# Patient Record
Sex: MALE | Race: BLACK OR AFRICAN AMERICAN | NOT HISPANIC OR LATINO | Employment: FULL TIME | URBAN - METROPOLITAN AREA
[De-identification: names, ages, dates, MRNs, and addresses within clinical notes are randomized per-mention and may not be internally consistent; named-entity substitution may affect disease eponyms.]

---

## 2021-06-09 ENCOUNTER — OFFICE VISIT (OUTPATIENT)
Dept: FAMILY MEDICINE CLINIC | Facility: CLINIC | Age: 31
End: 2021-06-09

## 2021-06-09 VITALS
SYSTOLIC BLOOD PRESSURE: 118 MMHG | HEART RATE: 70 BPM | TEMPERATURE: 97.6 F | RESPIRATION RATE: 18 BRPM | BODY MASS INDEX: 30.76 KG/M2 | HEIGHT: 67 IN | DIASTOLIC BLOOD PRESSURE: 78 MMHG | WEIGHT: 196 LBS

## 2021-06-09 DIAGNOSIS — I34.1 MITRAL VALVE PROLAPSE: Primary | ICD-10-CM

## 2021-06-09 DIAGNOSIS — Z80.0 FAMILY HISTORY OF COLON CANCER: ICD-10-CM

## 2021-06-09 PROCEDURE — 99203 OFFICE O/P NEW LOW 30 MIN: CPT | Performed by: PHYSICIAN ASSISTANT

## 2021-06-09 NOTE — PROGRESS NOTES
Subjective   Juan Manuel Davis is a 31 y.o. male.     History of Present Illness   Mitral valve prolapse diagnosed in childhood. Able to play sports like soccer, football and track without issue   Was getting Echo checked every 2 years   Competed in college with track and cross country and had it checked regularly at that time (notes he had to stop running due to issues with contusions and microfractures with his shins)   Only had echo checked once since 2013   Wants to get back into exercising   No recent heart palpitations    Does not drink caffeine   Unsure of cardiologist's name but thinks he has some records at [facility] he will request     Diagnosed with asthma in childhood   Has not needed inhaler since 6th grade     Grandfather had colon cancer in his 70s.   Mother and father have not had any issus with polyps or cancer   Brother has issue where he can not digest husks or casings   Wants colon cancer screening     Bipolar, anxiety, and depression run in family. Pt denies any issues with this     Pt interested in physical with screening labs in the future       The following portions of the patient's history were reviewed and updated as appropriate: allergies, current medications, past family history, past medical history, past social history, past surgical history and problem list.    Review of Systems   Constitutional: Negative.  Negative for chills, diaphoresis, fatigue and fever.   HENT: Negative.  Negative for congestion, ear discharge, ear pain, hearing loss, nosebleeds, postnasal drip, sinus pressure, sneezing and sore throat.    Eyes: Negative.    Respiratory: Negative.  Negative for cough, chest tightness, shortness of breath and wheezing.    Cardiovascular: Negative.  Negative for chest pain, palpitations and leg swelling.   Gastrointestinal: Negative for abdominal distention, abdominal pain, blood in stool, constipation, diarrhea, nausea and vomiting.   Genitourinary: Negative.  Negative for difficulty  "urinating, dysuria, flank pain, frequency, hematuria and urgency.   Musculoskeletal: Negative.  Negative for arthralgias, back pain, gait problem, joint swelling, myalgias, neck pain and neck stiffness.   Skin: Negative.  Negative for color change, pallor, rash and wound.   Neurological: Negative for dizziness, syncope, weakness, light-headedness, numbness and headaches.       Objective    Blood pressure 118/78, pulse 70, temperature 97.6 °F (36.4 °C), resp. rate 18, height 168.9 cm (66.5\"), weight 88.9 kg (196 lb).     Physical Exam  Vitals and nursing note reviewed.   Constitutional:       Appearance: Normal appearance. He is well-developed.   HENT:      Head: Normocephalic and atraumatic.      Right Ear: Tympanic membrane, ear canal and external ear normal.      Left Ear: Tympanic membrane, ear canal and external ear normal.      Nose: Nose normal.   Eyes:      Conjunctiva/sclera: Conjunctivae normal.   Neck:      Thyroid: No thyromegaly.      Trachea: No tracheal deviation.   Cardiovascular:      Rate and Rhythm: Normal rate and regular rhythm.      Comments: Mid systolic click   Pulmonary:      Effort: Pulmonary effort is normal. No respiratory distress.      Breath sounds: Normal breath sounds. No wheezing or rales.   Chest:      Chest wall: No tenderness.   Abdominal:      General: Abdomen is flat. Bowel sounds are normal. There is no distension.      Palpations: There is no mass.      Tenderness: There is no abdominal tenderness. There is no guarding or rebound.      Hernia: No hernia is present.   Musculoskeletal:      Cervical back: Normal range of motion and neck supple.   Lymphadenopathy:      Cervical: No cervical adenopathy.   Skin:     General: Skin is warm and dry.   Neurological:      Mental Status: He is alert and oriented to person, place, and time.   Psychiatric:         Mood and Affect: Mood normal.         Behavior: Behavior normal.         Thought Content: Thought content normal.         " Judgment: Judgment normal.         Assessment/Plan   Diagnoses and all orders for this visit:    1. Mitral valve prolapse (Primary)  -     Adult Transthoracic Echo Complete W/ Cont if Necessary Per Protocol  -     Ambulatory Referral to Cardiology    2. Family history of colon cancer  -     Ambulatory Referral to Gastroenterology      Referral for ECHO and cardiology placed.   Pt will likely not qualify for early colon cancer screening due to grandfather being advanced in age at diagnosis, however will get him set up with GI for consult to discuss per his request.

## 2021-07-21 ENCOUNTER — APPOINTMENT (OUTPATIENT)
Dept: CARDIOLOGY | Facility: HOSPITAL | Age: 31
End: 2021-07-21

## 2021-07-26 ENCOUNTER — OFFICE VISIT (OUTPATIENT)
Dept: FAMILY MEDICINE CLINIC | Facility: CLINIC | Age: 31
End: 2021-07-26

## 2021-07-26 VITALS
DIASTOLIC BLOOD PRESSURE: 74 MMHG | BODY MASS INDEX: 30.92 KG/M2 | HEIGHT: 67 IN | RESPIRATION RATE: 18 BRPM | WEIGHT: 197 LBS | HEART RATE: 72 BPM | SYSTOLIC BLOOD PRESSURE: 120 MMHG | TEMPERATURE: 98.4 F

## 2021-07-26 DIAGNOSIS — Z11.59 NEED FOR HEPATITIS C SCREENING TEST: ICD-10-CM

## 2021-07-26 DIAGNOSIS — Z13.220 ENCOUNTER FOR LIPID SCREENING FOR CARDIOVASCULAR DISEASE: ICD-10-CM

## 2021-07-26 DIAGNOSIS — Z80.0 FAMILY HX OF COLON CANCER: ICD-10-CM

## 2021-07-26 DIAGNOSIS — I34.1 MVP (MITRAL VALVE PROLAPSE): ICD-10-CM

## 2021-07-26 DIAGNOSIS — R53.83 FATIGUE, UNSPECIFIED TYPE: ICD-10-CM

## 2021-07-26 DIAGNOSIS — E55.9 VITAMIN D DEFICIENCY: ICD-10-CM

## 2021-07-26 DIAGNOSIS — Z00.00 ENCOUNTER FOR WELL ADULT EXAM WITHOUT ABNORMAL FINDINGS: Primary | ICD-10-CM

## 2021-07-26 DIAGNOSIS — Z13.6 ENCOUNTER FOR LIPID SCREENING FOR CARDIOVASCULAR DISEASE: ICD-10-CM

## 2021-07-26 PROCEDURE — 99395 PREV VISIT EST AGE 18-39: CPT | Performed by: PHYSICIAN ASSISTANT

## 2021-07-26 NOTE — PROGRESS NOTES
Chief Complaint   Patient presents with   • Annual Exam       Subjective   The patient is a 31 y.o. male who presents for annual exam      Nutrition:  well balanced   Exercise:  started back running    Sleep: doing well no concern          Last Colonoscopy:  never had. Pt has family hx of colon cancer with Grandfather who was in his 70s. Patient wanting colon cancer screening. Referral to GI placed last visit, however referrals unable to contact patient X 3 with voicemail left. Referral was closed.       Past Medical History,Medications, Allergies reviewed.       HPI  Has diagnosis of MVP   Did not complete echo that was ordered or see cardiology office as he was told he would have to pay 1700 dollars upfront. Patient unsure if this is an issue with deductible with his insurance or not.   Denies any current symptoms     Pt fasting for screening labs     Thinks he had tdap in 2015 for his job. Will double check on this      UTD on dental exams   Last eye exam in 2019.     Started back running. No flares with his asthma hx. Joints are doing okay     No new concerns at this time     Review of Systems   Constitutional: Positive for fatigue. Negative for chills, diaphoresis and fever.   HENT: Negative.  Negative for congestion, ear discharge, ear pain, hearing loss, nosebleeds, postnasal drip, sinus pressure, sneezing and sore throat.    Eyes: Negative.    Respiratory: Negative.  Negative for cough, chest tightness, shortness of breath and wheezing.    Cardiovascular: Negative.  Negative for chest pain, palpitations and leg swelling.   Gastrointestinal: Negative for abdominal distention, abdominal pain, blood in stool, constipation, diarrhea, nausea and vomiting.   Genitourinary: Negative.  Negative for difficulty urinating, dysuria, flank pain, frequency, hematuria and urgency.   Musculoskeletal: Negative.  Negative for arthralgias, back pain, gait problem, joint swelling, myalgias, neck pain and neck stiffness.   Skin:  "Negative.  Negative for color change, pallor, rash and wound.   Neurological: Negative for dizziness, syncope, weakness, light-headedness, numbness and headaches.       Objective     /74   Pulse 72   Temp 98.4 °F (36.9 °C)   Resp 18   Ht 168.9 cm (66.5\")   Wt 89.4 kg (197 lb)   BMI 31.32 kg/m²     Physical Exam  Vitals and nursing note reviewed.   Constitutional:       Appearance: Normal appearance. He is well-developed.   HENT:      Head: Normocephalic and atraumatic.      Right Ear: Tympanic membrane, ear canal and external ear normal.      Left Ear: Tympanic membrane, ear canal and external ear normal.      Nose: Nose normal.      Mouth/Throat:      Pharynx: No oropharyngeal exudate or posterior oropharyngeal erythema.   Eyes:      Conjunctiva/sclera: Conjunctivae normal.   Neck:      Thyroid: No thyromegaly.      Trachea: No tracheal deviation.   Cardiovascular:      Rate and Rhythm: Normal rate and regular rhythm.      Heart sounds: Murmur heard.   No friction rub. No gallop.    Pulmonary:      Effort: Pulmonary effort is normal. No respiratory distress.      Breath sounds: Normal breath sounds. No wheezing or rales.   Chest:      Chest wall: No tenderness.   Abdominal:      General: Bowel sounds are normal. There is no distension.      Palpations: Abdomen is soft. There is no mass.      Tenderness: There is no abdominal tenderness. There is no guarding or rebound.      Hernia: No hernia is present.   Musculoskeletal:      Cervical back: Normal range of motion and neck supple.   Lymphadenopathy:      Cervical: No cervical adenopathy.   Skin:     General: Skin is warm and dry.   Neurological:      Mental Status: He is alert and oriented to person, place, and time.   Psychiatric:         Behavior: Behavior normal.         Thought Content: Thought content normal.         Judgment: Judgment normal.         Assessment/Plan     1. Encounter for well adult exam without abnormal findings  - CBC w AUTO " Differential  - Comprehensive metabolic panel  - Lipid Panel  - TSH  - Hepatitis C antibody  - Testosterone  - Vitamin D 25 hydroxy    2. Vitamin D deficiency  - Vitamin D 25 hydroxy    3. Need for hepatitis C screening test  - Hepatitis C antibody    4. Encounter for lipid screening for cardiovascular disease  - Lipid Panel    5. Fatigue, unspecified type  - CBC w AUTO Differential  - Comprehensive metabolic panel  - TSH  - Testosterone    6. MVP (mitral valve prolapse)  - Ambulatory Referral to Cardiology    7. Family hx of colon cancer  - Ambulatory Referral to Gastroenterology    Labs as outlined in plan. New referral to GI placed. Pt directed to contact our office in 1 week if he has not heard back about scheduling   Will see if Dr. Gilmore's office may be a more affordable option of cardiology consult.   Labs as outlined in plan  F/u pending labs     Counseling was given to patient for the following topics: patient and family education, impressions and health care gaps  . Total time of the encounter was 20 minutes and 10 minutes was spent counseling.        PILY Hair

## 2021-07-27 LAB
25(OH)D3+25(OH)D2 SERPL-MCNC: 29.3 NG/ML (ref 30–100)
ALBUMIN SERPL-MCNC: 4.2 G/DL (ref 3.5–5.2)
ALBUMIN/GLOB SERPL: 1.6 G/DL
ALP SERPL-CCNC: 105 U/L (ref 39–117)
ALT SERPL-CCNC: 35 U/L (ref 1–41)
AST SERPL-CCNC: 45 U/L (ref 1–40)
BASOPHILS # BLD AUTO: 0.02 10*3/MM3 (ref 0–0.2)
BASOPHILS NFR BLD AUTO: 0.4 % (ref 0–1.5)
BILIRUB SERPL-MCNC: 0.3 MG/DL (ref 0–1.2)
BUN SERPL-MCNC: 19 MG/DL (ref 6–20)
BUN/CREAT SERPL: 16.7 (ref 7–25)
CALCIUM SERPL-MCNC: 9.5 MG/DL (ref 8.6–10.5)
CHLORIDE SERPL-SCNC: 106 MMOL/L (ref 98–107)
CHOLEST SERPL-MCNC: 175 MG/DL (ref 0–200)
CO2 SERPL-SCNC: 21.6 MMOL/L (ref 22–29)
CREAT SERPL-MCNC: 1.14 MG/DL (ref 0.76–1.27)
EOSINOPHIL # BLD AUTO: 0.1 10*3/MM3 (ref 0–0.4)
EOSINOPHIL NFR BLD AUTO: 1.8 % (ref 0.3–6.2)
ERYTHROCYTE [DISTWIDTH] IN BLOOD BY AUTOMATED COUNT: 12.6 % (ref 12.3–15.4)
GLOBULIN SER CALC-MCNC: 2.6 GM/DL
GLUCOSE SERPL-MCNC: 102 MG/DL (ref 65–99)
HCT VFR BLD AUTO: 46.1 % (ref 37.5–51)
HCV AB S/CO SERPL IA: 0.2 S/CO RATIO (ref 0–0.9)
HDLC SERPL-MCNC: 31 MG/DL (ref 40–60)
HGB BLD-MCNC: 15 G/DL (ref 13–17.7)
IMM GRANULOCYTES # BLD AUTO: 0.02 10*3/MM3 (ref 0–0.05)
IMM GRANULOCYTES NFR BLD AUTO: 0.4 % (ref 0–0.5)
LDLC SERPL CALC-MCNC: 116 MG/DL (ref 0–100)
LYMPHOCYTES # BLD AUTO: 2.58 10*3/MM3 (ref 0.7–3.1)
LYMPHOCYTES NFR BLD AUTO: 45.4 % (ref 19.6–45.3)
MCH RBC QN AUTO: 29.4 PG (ref 26.6–33)
MCHC RBC AUTO-ENTMCNC: 32.5 G/DL (ref 31.5–35.7)
MCV RBC AUTO: 90.4 FL (ref 79–97)
MONOCYTES # BLD AUTO: 0.44 10*3/MM3 (ref 0.1–0.9)
MONOCYTES NFR BLD AUTO: 7.7 % (ref 5–12)
NEUTROPHILS # BLD AUTO: 2.52 10*3/MM3 (ref 1.7–7)
NEUTROPHILS NFR BLD AUTO: 44.3 % (ref 42.7–76)
NRBC BLD AUTO-RTO: 0 /100 WBC (ref 0–0.2)
PLATELET # BLD AUTO: 275 10*3/MM3 (ref 140–450)
POTASSIUM SERPL-SCNC: 4.7 MMOL/L (ref 3.5–5.2)
PROT SERPL-MCNC: 6.8 G/DL (ref 6–8.5)
RBC # BLD AUTO: 5.1 10*6/MM3 (ref 4.14–5.8)
SODIUM SERPL-SCNC: 142 MMOL/L (ref 136–145)
TESTOST SERPL-MCNC: 373 NG/DL (ref 264–916)
TRIGL SERPL-MCNC: 158 MG/DL (ref 0–150)
TSH SERPL DL<=0.005 MIU/L-ACNC: 3.4 UIU/ML (ref 0.27–4.2)
VLDLC SERPL CALC-MCNC: 28 MG/DL (ref 5–40)
WBC # BLD AUTO: 5.68 10*3/MM3 (ref 3.4–10.8)

## 2021-07-28 LAB
HBA1C MFR BLD: 5.9 % (ref 4.8–5.6)
Lab: NORMAL
WRITTEN AUTHORIZATION: NORMAL

## 2022-12-01 ENCOUNTER — OFFICE VISIT (OUTPATIENT)
Dept: INTERNAL MEDICINE | Facility: CLINIC | Age: 32
End: 2022-12-01

## 2022-12-01 VITALS
DIASTOLIC BLOOD PRESSURE: 72 MMHG | WEIGHT: 197 LBS | SYSTOLIC BLOOD PRESSURE: 116 MMHG | OXYGEN SATURATION: 97 % | HEIGHT: 67 IN | HEART RATE: 63 BPM | BODY MASS INDEX: 30.92 KG/M2

## 2022-12-01 DIAGNOSIS — Z76.89 ENCOUNTER TO ESTABLISH CARE: Primary | ICD-10-CM

## 2022-12-01 DIAGNOSIS — F41.9 ANXIETY: ICD-10-CM

## 2022-12-01 DIAGNOSIS — Z23 NEED FOR INFLUENZA VACCINATION: ICD-10-CM

## 2022-12-01 DIAGNOSIS — I34.1 MITRAL VALVE PROLAPSE: ICD-10-CM

## 2022-12-01 DIAGNOSIS — R41.840 DIFFICULTY CONCENTRATING: ICD-10-CM

## 2022-12-01 PROCEDURE — 99213 OFFICE O/P EST LOW 20 MIN: CPT | Performed by: NURSE PRACTITIONER

## 2022-12-01 PROCEDURE — 90686 IIV4 VACC NO PRSV 0.5 ML IM: CPT | Performed by: NURSE PRACTITIONER

## 2022-12-01 PROCEDURE — 90471 IMMUNIZATION ADMIN: CPT | Performed by: NURSE PRACTITIONER

## 2022-12-01 RX ORDER — BUPROPION HYDROCHLORIDE 150 MG/1
150 TABLET ORAL DAILY
Qty: 30 TABLET | Refills: 1 | Status: SHIPPED | OUTPATIENT
Start: 2022-12-01 | End: 2023-03-14

## 2022-12-04 NOTE — PROGRESS NOTES
"    Office Note     Name: Juan Manuel Davis    : 1990     MRN: [id number]     Chief Complaint  Establish Care, Anxiety, and ADHD    Subjective     History of Present Illness:  Juan Manuel Davis is a 32 y.o. male who presents today to establish care with a new provider and to address complaints of anxiety. Past medical history and current home medications reviewed. He reports feeling anxious most days. He does not feel that he is depressed or having depressive symptoms. He is also concerned that he may have ADHD or dyslexia. He reports difficulty focusing, he needs to eliminate all distractions when studying or working, he has to self isolate to be productive, and has challenges putting all the pieces together when it comes to school, work and studying for the BAR exam. 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 He would like to be evaluated for ADHD. He denies any SI/HI. He denies tobacco use or recreational drug use. He does use ETOH socially. He denies further complaints or concerns at this time.    Review of Systems   Psychiatric/Behavioral: Positive for decreased concentration. The patient is nervous/anxious.        History reviewed. No pertinent past medical history.    History reviewed. No pertinent surgical history.    Social History     Socioeconomic History   • Marital status:    Tobacco Use   • Smoking status: Never   • Smokeless tobacco: Never         Current Outpatient Medications:   •  buPROPion XL (Wellbutrin XL) 150 MG 24 hr tablet, Take 1 tablet by mouth Daily., Disp: 30 tablet, Rfl: 1    Objective     Vital Signs  /72   Pulse 63   Ht 168.9 cm (66.5\")   Wt 89.4 kg (197 lb)   SpO2 97%   BMI 31.32 kg/m²   Estimated body mass index is 31.32 kg/m² as calculated from the following:    Height as of this encounter: 168.9 cm (66.5\").    Weight as of this encounter: 89.4 kg (197 lb).          Physical Exam  Constitutional:       Appearance: " Normal appearance.   HENT:      Head: Normocephalic and atraumatic.      Nose: Nose normal.   Eyes:      Extraocular Movements: Extraocular movements intact.      Conjunctiva/sclera: Conjunctivae normal.      Pupils: Pupils are equal, round, and reactive to light.   Cardiovascular:      Rate and Rhythm: Normal rate and regular rhythm.   Pulmonary:      Effort: Pulmonary effort is normal.      Breath sounds: Normal breath sounds.   Musculoskeletal:         General: Normal range of motion.      Cervical back: Normal range of motion and neck supple.   Skin:     General: Skin is warm and dry.   Neurological:      General: No focal deficit present.      Mental Status: He is alert and oriented to person, place, and time. Mental status is at baseline.   Psychiatric:         Mood and Affect: Mood normal.         Behavior: Behavior normal.         Thought Content: Thought content normal.         Judgment: Judgment normal.          Assessment and Plan     Diagnoses and all orders for this visit:    1. Encounter to establish care (Primary)    2. Anxiety  -     buPROPion XL (Wellbutrin XL) 150 MG 24 hr tablet; Take 1 tablet by mouth Daily.  Dispense: 30 tablet; Refill: 1  -     Ambulatory Referral to Behavioral Health    3. Difficulty concentrating    4. Mitral valve prolapse    5. Need for influenza vaccination  -     FluLaval/Fluarix/Fluzone >6 Months    Plan:  Will start Wellbutrin 150 mg daily.  Will refer to Behavioral Health for evaluation of possible ADHD.  He received his flu vaccine today.  Return to clinic in 1 month for follow up.    Follow Up  Return in about 1 month (around 1/1/2023).    ROSEMARY Reyez    Part of this note may be an electronic transcription/translation of spoken language to printed text using the Dragon Dictation System.

## 2023-03-14 ENCOUNTER — OFFICE VISIT (OUTPATIENT)
Dept: FAMILY MEDICINE CLINIC | Facility: CLINIC | Age: 33
End: 2023-03-14
Payer: COMMERCIAL

## 2023-03-14 VITALS
WEIGHT: 200 LBS | HEIGHT: 66 IN | BODY MASS INDEX: 32.14 KG/M2 | HEART RATE: 74 BPM | DIASTOLIC BLOOD PRESSURE: 86 MMHG | OXYGEN SATURATION: 99 % | SYSTOLIC BLOOD PRESSURE: 124 MMHG

## 2023-03-14 DIAGNOSIS — F90.0 ADHD (ATTENTION DEFICIT HYPERACTIVITY DISORDER), INATTENTIVE TYPE: Primary | ICD-10-CM

## 2023-03-14 DIAGNOSIS — I34.1 MITRAL VALVE PROLAPSE: ICD-10-CM

## 2023-03-14 LAB

## 2023-03-14 PROCEDURE — 99213 OFFICE O/P EST LOW 20 MIN: CPT | Performed by: INTERNAL MEDICINE

## 2023-03-14 PROCEDURE — 80305 DRUG TEST PRSMV DIR OPT OBS: CPT | Performed by: INTERNAL MEDICINE

## 2023-03-14 RX ORDER — METHYLPHENIDATE HYDROCHLORIDE 18 MG/1
18 TABLET ORAL EVERY MORNING
Qty: 30 TABLET | Refills: 0 | Status: SHIPPED | OUTPATIENT
Start: 2023-03-14

## 2023-03-14 NOTE — PROGRESS NOTES
Office Note     Name: Juan Manuel Davis    : 1990     MRN: [id number]     Chief Complaint  Establish Care and ADHD    Subjective     History of Present Illness:  Juan Manuel Davis is a 33 y.o. male who presents today for establish care.    Was recent evaluated and diagnosed with ADHD.    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  Sat for the exam again in February and struggled again both with studying and sitting for the exam.     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    Has always struggled with school and studying and felt he needed to study  More than most students to get by.  Has now been struggling with anxiety at work related to  His difficulty being able to focus concentrate and additionally pass the test.    Undergraduate was in , then got a masters in . Has always struggled with timed exams and standardized tests through high school college and law school.    In elementary school he did struggle with discipline issues but was chalked up to social issues at home. 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    A few months ago he was started on Wellbutrin for anxiety and ADHD symptoms but it dd not help so he was sent for formal ADHD /neuropschological eval.      MVP: was diagnosed as an infant, never required followup but did have to be cleared in elementary and middle school to play football.  In high school he had an evaluation and was noted to have mild MR Regurg.  Again in college had subsequent re-evaluation showing MR regurgtation but was cleared to participate in athletics,most recent eval was at [facility]    Review of Systems:   Review of Systems   Respiratory: Negative for shortness of breath.    Cardiovascular: Negative for chest pain and palpitations.   Neurological: Negative for dizziness.       Past  "Medical History:   Past Medical History:   Diagnosis Date   • ADHD (attention deficit hyperactivity disorder)    • Mitral valve prolapse        Past Surgical History:   Past Surgical History:   Procedure Laterality Date   • WISDOM TOOTH EXTRACTION         Family History:   Family History   Problem Relation Age of Onset   • Lupus Mother    • Depression Mother    • Bipolar disorder Mother    • Hypertension Mother    • Stroke Father    • Colon cancer Maternal Grandfather        Social History:   Social History     Socioeconomic History   • Marital status:    Tobacco Use   • Smoking status: Never   • Smokeless tobacco: Never   Vaping Use   • Vaping Use: Never used   Substance and Sexual Activity   • Alcohol use: Yes     Comment: rarely   • Drug use: Not Currently   • Sexual activity: Defer       Immunizations:   Immunization History   Administered Date(s) Administered   • COVID-19 (PFIZER) PURPLE CAP 02/27/2021, 03/30/2021, 11/19/2021   • FluLaval/Fluzone >6mos 12/01/2022   • Td 02/10/2006   • Tdap 01/01/2015        Medications:   No current outpatient medications on file.    Allergies:   No Known Allergies    Objective     Vital Signs  /86   Pulse 74   Ht 167.6 cm (66\")   Wt 90.7 kg (200 lb)   SpO2 99%   BMI 32.28 kg/m²   Estimated body mass index is 32.28 kg/m² as calculated from the following:    Height as of this encounter: 167.6 cm (66\").    Weight as of this encounter: 90.7 kg (200 lb).          Physical Exam  Vitals and nursing note reviewed.   Constitutional:       Appearance: Normal appearance.   Cardiovascular:      Rate and Rhythm: Normal rate and regular rhythm.      Heart sounds: No murmur heard.    No friction rub. No gallop.   Pulmonary:      Effort: Pulmonary effort is normal.      Breath sounds: Normal breath sounds. No wheezing, rhonchi or rales.   Neurological:      Mental Status: He is alert.            Procedures     Assessment and Plan     1. ADHD (attention deficit hyperactivity " disorder), inattentive type  - POC Urine Drug Screen Premier Bio-Cup: negative  - methylphenidate (Concerta) 18 MG CR tablet; Take 1 tablet by mouth Every Morning  Dispense: 30 tablet; Refill: 0    2. Mitral valve prolapse  No recent imaging studies available in Epic at this time. Will request Care Everywhere access.    Prior history of mitral valve prolapsed, last evaluated at [facility], currently asymptomatic but planning to start stimulant medication for  ADHD, patient would also like evaluation/clearnace prior to starting new more intensive exercise regimen  - Ambulatory Referral to Cardiology       I have obtained and reviewed neuropsychological evaluation doen by Krunal Griffith PSY.D performed at [facility] for this patient on 1/31/2023. It is summarized as follows:  Diagnostic impression is F90.1 Attention-deficit/hyperactivity disorder, predominantly inattentive.  The administered neuropsychological evaluation and the results were mildly abnormal based on self-reported symptoms coupled with complete neuropsychological profile which both supported diagnosis.  In the evaluation he exhibited above average cognitive ability but some discrepancy between cognitive ability versus performance.  Specifically he struggled with processing speed and attention.  They did state that his diagnosis and patient profile warrants consideration of stimulant medication, and also recommended lifestyle modifications as well.    Follow Up  Return in about 4 weeks (around 4/11/2023) for Followup with Dr Garibay- IN PERSON.    Lakshmi Garibay MD  [facility]  [address] [place] [id number] [phone]

## 2023-03-27 ENCOUNTER — TELEPHONE (OUTPATIENT)
Dept: FAMILY MEDICINE CLINIC | Facility: CLINIC | Age: 33
End: 2023-03-27
Payer: COMMERCIAL

## 2023-03-27 NOTE — TELEPHONE ENCOUNTER
Caller: Juan Manuel Davis    Relationship: Self    Best call back number: [phone]    What is the medical concern/diagnosis: DIAGNOSED WITH MODERATE/HIGH ADHD. KNOWN HEART DEFECT     What specialty or service is being requested: EKG AND OTHER HEART TESTS     What is the provider, practice or medical service name: DOESN'T MATTER WHO THE PATIENT SEES, PATIENT WOULD LIKE TO BE REFERRED TO A PLACE THAT COULD GET HIM IN BY APRIL.     Any additional details: PATIENT HAS ALREADY BEEN REFERRED TO CARDIOLOGY, BUT HE STATES THE PLACE CANT GET HIM IN UNTIL JUNE. PATIENT IS NEEDING SOMETHING SOONER. PATIENT DOESN'T CARE ABOUT LOCATION.     PATIENT WOULD LIKE A CALL WITH AN UPDATE AND WHERE HE IS BEING REFERRED TO. IF NO ANSWER LEAVE A MESSAGE.

## 2023-03-28 NOTE — TELEPHONE ENCOUNTER
As you can see from my note and in the denisha, a cardiology referral was placed on 3/14/23. Please forward this message on to Dary so that se can address it and up date the patient

## 2023-03-29 ENCOUNTER — OFFICE VISIT (OUTPATIENT)
Dept: CARDIOLOGY | Facility: CLINIC | Age: 33
End: 2023-03-29
Payer: COMMERCIAL

## 2023-03-29 ENCOUNTER — TELEPHONE (OUTPATIENT)
Dept: CARDIOLOGY | Facility: CLINIC | Age: 33
End: 2023-03-29

## 2023-03-29 VITALS
HEART RATE: 73 BPM | WEIGHT: 197 LBS | SYSTOLIC BLOOD PRESSURE: 128 MMHG | TEMPERATURE: 97.1 F | RESPIRATION RATE: 18 BRPM | DIASTOLIC BLOOD PRESSURE: 82 MMHG | OXYGEN SATURATION: 99 % | HEIGHT: 66 IN | BODY MASS INDEX: 31.66 KG/M2

## 2023-03-29 DIAGNOSIS — I34.1 MITRAL VALVE PROLAPSE: Primary | ICD-10-CM

## 2023-03-29 DIAGNOSIS — R01.1 HEART MURMUR: ICD-10-CM

## 2023-03-29 NOTE — TELEPHONE ENCOUNTER
He wants to know if he can go back to lifting weights and sprinting or should he wait till after the echo??    Please call him

## 2023-03-29 NOTE — PROGRESS NOTES
[facility]  [facility] CARDIOLOGY  [address] [place] [address]  Dept: [phone]  Dept Fax: [phone]    Juan Manuel Davis  1990    New Patient Office Note    History of Present Illness:  Juan Manuel Davis is a 33 y.o. male who presents to the clinic for Establish Care. Evaluation for history of MVP- He is 33 years old with history of MVP since he was a child, has seen multiples cardiologist last one 2016 with same diagnostic and . He denies any chest pain, SOB, palpitations, edema, syncope, he is taking meds for ADHD and his doctor is concerned about potential effects of higher dose, , his BP is good 120.60, his cardiac exam shows a systolic murmur on the left sternal border, no increasing with valsalva 3 /.6- his EKG sinus with Hr 57 normal EKG, will get an echo,     The following portions of the patient's history were reviewed and updated as appropriate: allergies, current medications, past family history, past medical history, past social history, past surgical history, and problem list.    Medications:  methylphenidate    Subjective  No Known Allergies     Past Medical History:   Diagnosis Date   • ADHD (attention deficit hyperactivity disorder)    • Mitral valve prolapse        Past Surgical History:   Procedure Laterality Date   • WISDOM TOOTH EXTRACTION         Family History   Problem Relation Age of Onset   • Lupus Mother    • Depression Mother    • Bipolar disorder Mother    • Hypertension Mother    • Stroke Father    • Colon cancer Maternal Grandfather         Social History     Socioeconomic History   • Marital status:    Tobacco Use   • Smoking status: Never   • Smokeless tobacco: Never   Vaping Use   • Vaping Use: Never used   Substance and Sexual Activity   • Alcohol use: Yes     Comment: rarely   • Drug use: Not Currently   • Sexual activity: Defer       Review of Systems   Constitutional: Negative.    HENT: Negative.    Respiratory: Negative.  "   Cardiovascular: Negative.    Endocrine: Negative.    Genitourinary: Negative.    Musculoskeletal: Negative.    Skin: Negative.    Allergic/Immunologic: Negative.    Neurological: Negative.    Hematological: Negative.    Psychiatric/Behavioral: Negative.        Cardiovascular Procedures    ECHO/MUGA:  STRESS TESTS:   CARDIAC CATH:   DEVICES:   HOLTER:   CT/MRI:   VASCULAR:   CARDIOTHORACIC:     Objective  Vitals:    03/29/23 1014   BP: 128/82   BP Location: Right arm   Patient Position: Lying   Cuff Size: Adult   Pulse: 73   Resp: 18   Temp: 97.1 °F (36.2 °C)   TempSrc: Infrared   SpO2: 99%   Weight: 89.4 kg (197 lb)   Height: 167.6 cm (66\")   PainSc: 0-No pain       Physical Exam  Constitutional:       Appearance: Healthy appearance. Not in distress.   Eyes:      Pupils: Pupils are equal, round, and reactive to light.   HENT:    Mouth/Throat:      Pharynx: Oropharynx is clear.   Neck:      Thyroid: Thyroid normal.   Pulmonary:      Effort: Pulmonary effort is normal.      Breath sounds: Normal breath sounds.   Cardiovascular:      PMI at left midclavicular line. Normal rate. Regular rhythm. Normal S1. Normal S2.      Murmurs: There is a grade 3/6 high frequency blowing holosystolic murmur at the apex.      No gallop. No click. No rub.   Pulses:     Intact distal pulses.   Edema:     Peripheral edema absent.   Abdominal:      General: Bowel sounds are normal.   Musculoskeletal:      Cervical back: Normal range of motion and neck supple. Skin:     General: Skin is warm.   Neurological:      General: No focal deficit present.      Mental Status: Alert.          Diagnostic Data    ECG 12 Lead    Date/Time: 3/29/2023 10:50 AM  Performed by: David Schwarz MD  Authorized by: David Schwarz MD   Comparison: not compared with previous ECG   Previous ECG: no previous ECG available  Rhythm: sinus rhythm and sinus bradycardia  Rate: bradycardic  BPM: 57  QRS axis: normal    Clinical impression: normal " ECG            Assessment and Plan  Diagnoses and all orders for this visit:    Mitral valve prolapse- will get an echo,  -     Adult Transthoracic Echo Complete W/ Cont if Necessary Per Protocol; Future    Heart murmur- Very mild,left sternal border, will see the echo        Return in about 2 weeks (around 4/12/2023) for Recheck with Dr. Schwarz.    David Schwarz MD  03/29/2023

## 2023-03-29 NOTE — TELEPHONE ENCOUNTER
He wants to know if he can go back to lifting weights and sprinting or should he wait till after the echo??  Please advise?

## 2023-04-07 ENCOUNTER — PATIENT ROUNDING (BHMG ONLY) (OUTPATIENT)
Dept: CARDIOLOGY | Facility: CLINIC | Age: 33
End: 2023-04-07
Payer: COMMERCIAL

## 2023-04-07 NOTE — PROGRESS NOTES
April 7, 2023    Hello, may I speak with Juan Manuel Davis Jr.?    My name is kori     I am  with [facility]  [facility] CARDIOLOGY  [address] [place] [address].    Before we get started may I verify your date of birth? 1990    Unable to reach patient

## 2023-04-12 ENCOUNTER — OFFICE VISIT (OUTPATIENT)
Dept: FAMILY MEDICINE CLINIC | Facility: CLINIC | Age: 33
End: 2023-04-12
Payer: COMMERCIAL

## 2023-04-12 ENCOUNTER — OFFICE VISIT (OUTPATIENT)
Dept: CARDIOLOGY | Facility: CLINIC | Age: 33
End: 2023-04-12
Payer: COMMERCIAL

## 2023-04-12 VITALS
SYSTOLIC BLOOD PRESSURE: 132 MMHG | HEART RATE: 59 BPM | HEIGHT: 66 IN | BODY MASS INDEX: 31.18 KG/M2 | DIASTOLIC BLOOD PRESSURE: 90 MMHG | WEIGHT: 194 LBS | OXYGEN SATURATION: 97 %

## 2023-04-12 VITALS
HEIGHT: 66 IN | TEMPERATURE: 97.3 F | RESPIRATION RATE: 20 BRPM | HEART RATE: 66 BPM | WEIGHT: 195 LBS | SYSTOLIC BLOOD PRESSURE: 120 MMHG | DIASTOLIC BLOOD PRESSURE: 82 MMHG | OXYGEN SATURATION: 99 % | BODY MASS INDEX: 31.34 KG/M2

## 2023-04-12 DIAGNOSIS — F90.0 ADHD (ATTENTION DEFICIT HYPERACTIVITY DISORDER), INATTENTIVE TYPE: Primary | ICD-10-CM

## 2023-04-12 DIAGNOSIS — R01.1 HEART MURMUR: ICD-10-CM

## 2023-04-12 DIAGNOSIS — I34.1 MITRAL VALVE PROLAPSE: Primary | ICD-10-CM

## 2023-04-12 PROCEDURE — 99213 OFFICE O/P EST LOW 20 MIN: CPT | Performed by: INTERNAL MEDICINE

## 2023-04-12 RX ORDER — METHYLPHENIDATE HYDROCHLORIDE 27 MG/1
27 TABLET ORAL EVERY MORNING
Qty: 30 TABLET | Refills: 0 | Status: SHIPPED | OUTPATIENT
Start: 2023-04-12

## 2023-04-12 NOTE — PROGRESS NOTES
Office Note     Name: Juan Manuel Davis Jr.    : 1990     MRN: [id number]     Chief Complaint  ADHD (Pt is here for a medication recheck on anxiety. )    Subjective     History of Present Illness:  Juan Manuel Davis Jr. is a 33 y.o. male who presents today for ADHD followup.    Started stimulant medication at last visit. Mostly started low dose because of  Reported mitral valve prolapse. However since last visit has had repeat echo and cardiology evaluation.    States medications have provided minimal improvement in terms of focus/concentration but no significant side effects.    Review of Systems:   Review of Systems   Respiratory: Negative for chest tightness.    Cardiovascular: Negative for chest pain and palpitations.       Past Medical History:   Past Medical History:   Diagnosis Date   • ADHD (attention deficit hyperactivity disorder)    • Mitral valve prolapse        Past Surgical History:   Past Surgical History:   Procedure Laterality Date   • WISDOM TOOTH EXTRACTION         Family History:   Family History   Problem Relation Age of Onset   • Lupus Mother    • Depression Mother    • Bipolar disorder Mother    • Hypertension Mother    • Stroke Father    • Colon cancer Maternal Grandfather        Social History:   Social History     Socioeconomic History   • Marital status:    Tobacco Use   • Smoking status: Never   • Smokeless tobacco: Never   Vaping Use   • Vaping Use: Never used   Substance and Sexual Activity   • Alcohol use: Yes     Comment: rarely   • Drug use: Not Currently   • Sexual activity: Defer       Immunizations:   Immunization History   Administered Date(s) Administered   • COVID-19 (PFIZER) PURPLE CAP 2021, 2021, 2021   • FluLaval/Fluzone >6mos 2022   • Td 02/10/2006   • Tdap 2015        Medications:     Current Outpatient Medications:   •  methylphenidate (Concerta) 18 MG CR tablet, Take 1 tablet by mouth Every Morning, Disp: 30 tablet, Rfl:  "0    Allergies:   No Known Allergies    Objective     Vital Signs  /90   Pulse 59   Ht 167.6 cm (66\")   Wt 88 kg (194 lb)   SpO2 97%   BMI 31.31 kg/m²   Estimated body mass index is 31.31 kg/m² as calculated from the following:    Height as of this encounter: 167.6 cm (66\").    Weight as of this encounter: 88 kg (194 lb).          Physical Exam  Vitals and nursing note reviewed.   Constitutional:       Appearance: Normal appearance.   Cardiovascular:      Rate and Rhythm: Normal rate and regular rhythm.      Heart sounds: No murmur heard.    No friction rub. No gallop.   Pulmonary:      Effort: Pulmonary effort is normal.      Breath sounds: Normal breath sounds. No wheezing, rhonchi or rales.   Neurological:      Mental Status: He is alert.            Procedures     Assessment and Plan     1. ADHD (attention deficit hyperactivity disorder), inattentive type  I have reviewed echo report and it appears to be relatively normal therefore I do not expect any restriction in medications. Will increase concerta from 18 to 27 MG.  - methylphenidate (Concerta) 27 MG CR tablet; Take 1 tablet by mouth Every Morning  Dispense: 30 tablet; Refill: 0       Follow Up  Return in about 4 weeks (around 5/10/2023) for Followup with Dr Garibay- IN PERSON.    Patient was given instructions and counseling regarding his condition or for health maintenance advice. Please see specific information pulled into the AVS if appropriate.     Lakshmi Garibay MD  [facility]  [address] [place] [id number] [phone]  Answers for HPI/ROS submitted by the patient on 4/12/2023  What is the primary reason for your visit?: Other  Please describe your symptoms.: ADHD medicine follow up.  Have you had these symptoms before?: No  How long have you been having these symptoms?: Greater than 2 weeks  Please list any medications you are currently taking for this condition.: " N/a  Please describe any probable cause for these symptoms. : N/A

## 2023-04-12 NOTE — PROGRESS NOTES
[facility]  [facility] CARDIOLOGY  [address] [place] [address]  Dept: [phone]  Dept Fax: [phone]    Juan Manuel Davis Jr.  1990    Follow Up Office Visit Note    History of Present Illness:  Juan Manuel Davis Jr. is a 33 y.o. male who presents to the clinic for Follow-up. MVP- He is asymptomatic, has the echo with mild thickening of the mitral leaflet and mild MR but no eveident prolapse was seen, will observe, he does not have any restriction for physical activity or any meds     The following portions of the patient's history were reviewed and updated as appropriate: allergies, current medications, past family history, past medical history, past social history, past surgical history, and problem list.    Medications:  methylphenidate    Subjective  No Known Allergies     Past Medical History:   Diagnosis Date   • ADHD (attention deficit hyperactivity disorder)    • Mitral valve prolapse        Past Surgical History:   Procedure Laterality Date   • WISDOM TOOTH EXTRACTION         Family History   Problem Relation Age of Onset   • Lupus Mother    • Depression Mother    • Bipolar disorder Mother    • Hypertension Mother    • Stroke Father    • Colon cancer Maternal Grandfather         Social History     Socioeconomic History   • Marital status:    Tobacco Use   • Smoking status: Never   • Smokeless tobacco: Never   Vaping Use   • Vaping Use: Never used   Substance and Sexual Activity   • Alcohol use: Yes     Comment: rarely   • Drug use: Not Currently   • Sexual activity: Defer       Review of Systems   Constitutional: Negative.    HENT: Negative.    Respiratory: Negative.    Cardiovascular: Negative.    Endocrine: Negative.    Genitourinary: Negative.    Musculoskeletal: Negative.    Skin: Negative.    Allergic/Immunologic: Negative.    Neurological: Negative.    Hematological: Negative.    Psychiatric/Behavioral: Negative.        Cardiovascular  "Procedures    ECHO/MUGA:  STRESS TESTS:   CARDIAC CATH:   DEVICES:   HOLTER:   CT/MRI:   VASCULAR:   CARDIOTHORACIC:     Objective  Vitals:    04/12/23 1551   BP: 120/82   Pulse: 66   Resp: 20   Temp: 97.3 °F (36.3 °C)   SpO2: 99%   Weight: 88.5 kg (195 lb)   Height: 167.6 cm (65.98\")   PainSc: 0-No pain     Body mass index is 31.49 kg/m².     Physical Exam  Constitutional:       Appearance: Healthy appearance. Not in distress.   Neck:      Vascular: No JVR. JVD normal.   Pulmonary:      Effort: Pulmonary effort is normal.      Breath sounds: Normal breath sounds. No wheezing. No rhonchi. No rales.   Chest:      Chest wall: Not tender to palpatation.   Cardiovascular:      PMI at left midclavicular line. Normal rate. Regular rhythm. Normal S1. Normal S2.      Murmurs: There is no murmur.      No gallop. No click. No rub.   Pulses:     Intact distal pulses.   Edema:     Peripheral edema absent.   Abdominal:      General: Bowel sounds are normal.      Palpations: Abdomen is soft.      Tenderness: There is no abdominal tenderness.   Musculoskeletal: Normal range of motion.         General: No tenderness. Skin:     General: Skin is warm and dry.   Neurological:      General: No focal deficit present.      Mental Status: Alert and oriented to person, place and time.          Diagnostic Data  Procedures    Assessment and Plan  Diagnoses and all orders for this visit:    Mitral valve prolapse-- by history, no very clear in our echo, trivial to mild MR, no contraindication for any physical activity, denies any chest pain, SOB, palpitations    Heart murmur- only trivial to mild MR was seen mainly I heard a click         Return if symptoms worsen or fail to improve, for Recheck with Dr. Schwarz.    David Schwarz MD  04/12/2023  "